# Patient Record
Sex: FEMALE | Race: WHITE | NOT HISPANIC OR LATINO | Employment: UNEMPLOYED | URBAN - METROPOLITAN AREA
[De-identification: names, ages, dates, MRNs, and addresses within clinical notes are randomized per-mention and may not be internally consistent; named-entity substitution may affect disease eponyms.]

---

## 2021-05-27 ENCOUNTER — RECORDS - HEALTHEAST (OUTPATIENT)
Dept: ADMINISTRATIVE | Facility: CLINIC | Age: 61
End: 2021-05-27

## 2021-05-28 ENCOUNTER — RECORDS - HEALTHEAST (OUTPATIENT)
Dept: ADMINISTRATIVE | Facility: CLINIC | Age: 61
End: 2021-05-28

## 2021-06-02 ENCOUNTER — RECORDS - HEALTHEAST (OUTPATIENT)
Dept: ADMINISTRATIVE | Facility: CLINIC | Age: 61
End: 2021-06-02

## 2021-06-16 PROBLEM — J43.2 CENTRILOBULAR EMPHYSEMA (H): Status: ACTIVE | Noted: 2017-06-29

## 2021-06-16 PROBLEM — K21.9 GERD (GASTROESOPHAGEAL REFLUX DISEASE): Status: ACTIVE | Noted: 2017-03-07

## 2021-06-16 PROBLEM — J45.20 MILD INTERMITTENT ASTHMA WITHOUT COMPLICATION: Status: ACTIVE | Noted: 2017-03-22

## 2021-06-26 ENCOUNTER — HOSPITAL ENCOUNTER (EMERGENCY)
Dept: EMERGENCY MEDICINE | Facility: HOSPITAL | Age: 61
Discharge: HOME | End: 2021-06-27
Attending: STUDENT IN AN ORGANIZED HEALTH CARE EDUCATION/TRAINING PROGRAM
Payer: COMMERCIAL

## 2021-06-26 DIAGNOSIS — L03.115 CELLULITIS OF RIGHT LOWER EXTREMITY: ICD-10-CM

## 2021-06-26 DIAGNOSIS — R60.0 BILATERAL LOWER EXTREMITY EDEMA: ICD-10-CM

## 2021-06-26 LAB
ANION GAP SERPL CALCULATED.3IONS-SCNC: 9 MMOL/L (ref 5–18)
BNP SERPL-MCNC: 12 PG/ML (ref 0–93)
BUN SERPL-MCNC: 16 MG/DL (ref 8–22)
CALCIUM SERPL-MCNC: 9.7 MG/DL (ref 8.5–10.5)
CHLORIDE BLD-SCNC: 108 MMOL/L (ref 98–107)
CO2 SERPL-SCNC: 25 MMOL/L (ref 22–31)
CREAT SERPL-MCNC: 0.9 MG/DL (ref 0.6–1.1)
ERYTHROCYTE [DISTWIDTH] IN BLOOD BY AUTOMATED COUNT: 15.9 % (ref 11–14.5)
GFR SERPL CREATININE-BSD FRML MDRD: >60 ML/MIN/1.73M2
GLUCOSE BLD-MCNC: 97 MG/DL (ref 70–125)
HCT VFR BLD AUTO: 39 % (ref 35–47)
HGB BLD-MCNC: 13.1 G/DL (ref 12–16)
MCH RBC QN AUTO: 31.1 PG (ref 27–34)
MCHC RBC AUTO-ENTMCNC: 33.6 G/DL (ref 32–36)
MCV RBC AUTO: 93 FL (ref 80–100)
PLATELET # BLD AUTO: 267 THOU/UL (ref 140–440)
PMV BLD AUTO: 9.2 FL (ref 8.5–12.5)
POTASSIUM BLD-SCNC: 4.4 MMOL/L (ref 3.5–5)
RBC # BLD AUTO: 4.21 MILL/UL (ref 3.8–5.4)
SODIUM SERPL-SCNC: 142 MMOL/L (ref 136–145)
TROPONIN I SERPL-MCNC: <0.01 NG/ML (ref 0–0.29)
WBC: 7.5 THOU/UL (ref 4–11)

## 2021-06-26 ASSESSMENT — MIFFLIN-ST. JEOR: SCORE: 1566.52

## 2021-06-27 LAB
ATRIAL RATE - MUSE: 87 BPM
DIASTOLIC BLOOD PRESSURE - MUSE: 86 MMHG
INTERPRETATION ECG - MUSE: NORMAL
P AXIS - MUSE: 41 DEGREES
PR INTERVAL - MUSE: 144 MS
QRS DURATION - MUSE: 94 MS
QT - MUSE: 400 MS
QTC - MUSE: 481 MS
R AXIS - MUSE: 47 DEGREES
SYSTOLIC BLOOD PRESSURE - MUSE: 141 MMHG
T AXIS - MUSE: 65 DEGREES
VENTRICULAR RATE- MUSE: 87 BPM

## 2021-06-27 RX ORDER — FUROSEMIDE 40 MG
40 TABLET ORAL DAILY
Qty: 5 TABLET | Refills: 0 | Status: SHIPPED | OUTPATIENT
Start: 2021-06-27 | End: 2021-07-02

## 2021-07-06 VITALS — HEIGHT: 62 IN | WEIGHT: 230 LBS | BODY MASS INDEX: 42.33 KG/M2

## 2021-07-07 NOTE — ED TRIAGE NOTES
Pt comes in by self. Had cellulites of the right leg about a month ago. States same symptoms are coming back notes warmth, redness and swelling in her right leg. Did not finish full course abx with last infection.

## 2021-07-07 NOTE — ED PROVIDER NOTES
EMERGENCY DEPARTMENT ENCOUNTER       ED Course & Medical Decision Making     10:14 PM I met with the patient, obtained history, performed an initial exam, and discussed options and plan for diagnostics and treatment here in the ED. PPE: Provider wore gloves, N95 mask, eye protection   12:45 AM Updated the patient. We discussed plans for discharge including supportive cares, symptomatic treatment, outpatient follow up, and reasons to return to the emergency department.      Final Impression  61 y.o. female presents for evaluation of some swelling and warmth in her right leg.  Patient reports that she was treated for cellulitis of the right leg on 5/1, was prescribed Keflex 500 mg 3x daily x10 days and symptoms improved.  On exam today patient does have some fairly symmetric +2 pitting edema of bilateral lower extremities, as well as some slight warmth though no appreciable erythema of the right leg.  No other skin changes suggestive of advanced cellulitis or other skin infection.  Denies CHF history, but does have significant smoking history.  Denies having a current primary care provider.  Work-up in the ED shows negative DVT studies of bilateral lower extremities, nonischemic EKG, negative troponin, clear chest x-ray, normal CBC, BMP, negative BNP.  No signs of acute heart failure.  Discussed with patient that I suspect her leg swelling is predisposing her to cellulitis, was given a one-time dose of Lasix in the ED and had good urine response.  Patient reports about 10-15 pounds of weight gain in the last 1-2 months unintentionally.  Based on exam, appears to be retaining fluid in her legs and likely other tissues.  Will start patient on a 5-day course of Lasix 40 mg daily to help clear water weight from her legs, will also start on Keflex (500 mg twice daily x5 days dosing chosen because patient stated she would not be able to take the medication 4 times a day, did not want to take a 7 or 10-day course).  Will  recommend she follow-up with a primary care clinic, primary care referral made this patient should get plugged in with a primary care provider for further work-up for long-term treatment of her recurrent leg swelling.    Prior to making a final disposition on this patient the results of patient's tests and other diagnostic studies were discussed with the patient. All questions were answered. Patient expressed understanding of the plan and was amenable to it.    Medications   furosemide injection 40 mg (LASIX) (40 mg Intravenous Given 6/26/21 2304)       Final Impression     1. Bilateral lower extremity edema    2. Cellulitis of right lower extremity        Chief Complaint     Chief Complaint   Patient presents with     Possible Cellulitus       Pt comes in by self. Had cellulites of the right leg about a month ago. States same symptoms are coming back notes warmth, redness and swelling in her right leg. Did not finish full course abx with last infection.        HPI     Nadine Rene is a 61 y.o. female who presents for evaluation of leg swelling.     Per chart review, patient was seen in this ED on 5/1/2021 for evaluation of lower right leg pain with associated swelling, erythema, and pruritus. Labs showed no leukocytosis (WBC 8.4) and mildly elevated CRP (2.7). X-ray right tib/fib negative for gas or radiopaque foreign object. Venous ultrasound was negative. CXR demonstrated low lung volumes and underlying subtle interstitial process bilaterally. Patient was discharged home with prescription for Keflex.        Patient reports that she has had new leg swelling (right > left) for the past 3 days. Her leg swelling is improved in the morning when she gets up and progressively worsens throughout the day. She also reports that she has gained 10-20 lbs over the last 1-2 months. She notes that she had cellulitis of her right leg a couple months ago at which time she presented with redness, warmth, and swelling to  her right leg. No recent travel or surgery. She does not take any diuretics. No history of heart problems or blood clots. She does report a history of COPD.    Additionally, patient reports that she has had right shoulder pain for about 1 year that she thinks was caused by a fall. Her pain was improving, but she took another fall more recently when she tripped over her dog and her pain returned. She endorses associated decreased range of motion at the right shoulder, noting that her shoulder feels better when she keeps her right arm close to her body.     Social history: Patient is a current smoker (reports 2-3 cigarettes per day).       I, Kailyn Sandoval, am serving as a scribe to document services personally performed by Dr. Shawn Ritter MD, based on my observation and the provider's statements to me. I, Dr. Shawn Ritter MD attest that Kailyn Sandoval is acting in a scribe capacity, has observed my performance of the services and has documented them in accordance with my direction.    Past Medical History     No past medical history on file.  Relevant past surgical history reviewed with patient, unless otherwise stated in HPI, history not pertinent to this visit.  No family history on file.   Social History     Tobacco Use     Smoking status: Current Every Day Smoker     Smokeless tobacco: Never Used   Substance Use Topics     Alcohol use: Not on file     Drug use: Not on file       Relevant past medical, surgical, family and social history as documented above, has been reviewed and discussed with patient. No changes or additions, unless otherwise noted in the HPI.    Current Medications     Current Discharge Medication List      START taking these medications    Details   cephalexin (KEFLEX) 500 MG capsule Take 1 capsule (500 mg total) by mouth 2 (two) times a day for 5 days.  Qty: 10 capsule, Refills: 0    Associated Diagnoses: Cellulitis of right lower extremity      furosemide (LASIX) 40 MG tablet Take  1 tablet (40 mg total) by mouth daily for 5 days.  Qty: 5 tablet, Refills: 0    Associated Diagnoses: Bilateral lower extremity edema         CONTINUE these medications which have NOT CHANGED    Details   albuterol (PROAIR HFA;PROVENTIL HFA;VENTOLIN HFA) 90 mcg/actuation inhaler Inhale 2 puffs every 4 (four) hours as needed for wheezing or shortness of breath.  Qty: 1 Inhaler, Refills: 0    Comments: May substitute the equivalent medication per insurance preference.  Associated Diagnoses: History of COPD      albuterol (PROVENTIL) 2.5 mg /3 mL (0.083 %) nebulizer solution Take 3 mL (2.5 mg total) by nebulization every 4 (four) hours as needed for wheezing or shortness of breath.  Qty: 75 mL, Refills: 0    Associated Diagnoses: History of COPD      ibuprofen (ADVIL,MOTRIN) 400 MG tablet Take 1 tablet (400 mg total) by mouth every 6 (six) hours as needed for pain.  Qty: 30 tablet, Refills: 0    Associated Diagnoses: Acute pain of right shoulder      inhalational spacing device Spcr Use as directed with albuterol inhaler  Qty: 1 each, Refills: 0    Associated Diagnoses: History of COPD             Allergies     No Known Allergies    Review of Systems     Constitutional: Endorses unintentional weight gain (10-15 lbs in past few months). Denies fever, chills, or fatigue   Eyes: Denies visual changes or discharge  HENT: Denies sore throat, ear pain or neck pain  Respiratory: Denies cough or shortness of breath    Cardiovascular: Endorses leg swelling (right > left). Denies chest pain or palpitations   GI: Denies abdominal pain, nausea, vomiting, or dark, bloody stools  : Denies hematuria, dysuria, or flank pain  Musculoskeletal: Endorses right shoulder pain and decreased ROM (chronic, >1 year). Denies any new back pain  Skin: Denies rashes or wound  Neurologic: Denies current headache, new weakness, focal weakness, or sensory changes      Remainder of systems reviewed, unless noted in HPI all others  "negative.    Physical Exam     /86   Pulse 85   Temp 98.7  F (37.1  C) (Oral)   Resp 16   Ht 5' 2\" (1.575 m)   Wt (!) 230 lb (104.3 kg)   SpO2 94%   BMI 42.07 kg/m    Constitutional: Awake, alert, in no acute distress  Head: Normocephalic, atraumatic.  ENT: Mucous membranes moist.   Eyes: PERRL, EOMI, Conjunctiva normal  Respiratory: Respirations even, unlabored. Lungs clear to ascultation bilaterally, no crackles, in no acute respiratory distress.  Cardiovascular: Regular rate and rhythm. +2 radial pulses, equal bilaterally. +2 pitting edema bilateral lower extremities  GI: Abdomen soft, non-tender to palpation in all 4 quadrants. No guarding or rebound. Bowel sounds intact on all 4 quadrants.   : No CVA tenderness.    Musculoskeletal: Moves all 4 extremities equally, strength symmetrical on bilateral uppers and lowers.  Integument: Warm, dry.  Very mild warmth of the right leg when compared to the left leg.  No appreciable erythema of lower extremities, skin appears normal in bilateral lower legs, no rashes, wounds, or drainage.  Extremely dry, thickened, cracked skin on bilateral feet, though no signs of focal infection feet.  Neurologic: Alert & oriented x 3. Normal speech. Grossly normal motor and sensory function. No focal deficits noted.  Psychiatric: Normal mood and affect.     Labs     Results for orders placed or performed during the hospital encounter of 06/26/21   HM2 (CBC W/O DIFF)   Result Value Ref Range    WBC 7.5 4.0 - 11.0 thou/uL    RBC 4.21 3.80 - 5.40 mill/uL    Hemoglobin 13.1 12.0 - 16.0 g/dL    Hematocrit 39.0 35.0 - 47.0 %    MCV 93 80 - 100 fL    MCH 31.1 27.0 - 34.0 pg    MCHC 33.6 32.0 - 36.0 g/dL    RDW 15.9 (H) 11.0 - 14.5 %    Platelets 267 140 - 440 thou/uL    MPV 9.2 8.5 - 12.5 fL   Basic Metabolic Panel   Result Value Ref Range    Sodium 142 136 - 145 mmol/L    Potassium 4.4 3.5 - 5.0 mmol/L    Chloride 108 (H) 98 - 107 mmol/L    CO2 25 22 - 31 mmol/L    Anion Gap, " Calculation 9 5 - 18 mmol/L    Glucose 97 70 - 125 mg/dL    Calcium 9.7 8.5 - 10.5 mg/dL    BUN 16 8 - 22 mg/dL    Creatinine 0.90 0.60 - 1.10 mg/dL    GFR MDRD Af Amer >60 >60 mL/min/1.73m2    GFR MDRD Non Af Amer >60 >60 mL/min/1.73m2   BNP(B-type Natriuretic Peptide)   Result Value Ref Range    BNP 12 0 - 93 pg/mL   Troponin I   Result Value Ref Range    Troponin I <0.01 0.00 - 0.29 ng/mL       EKG     Normal sinus rhythm, rate 87.  .  QRS 94.  QTc 41.  No ST segment elevations or depressions.      Radiology     I have ordered and reviewed the following imaging exams. Upon independent review of the images and radiology reads, I agree with the reads documented below.    Xr Chest 2 Views    Result Date: 6/27/2021  EXAM: XR CHEST 2 VIEWS LOCATION: [facility] DATE/TIME: 6/26/2021 11:47 PM INDICATION: Pinning edema. Evaluate for CHF. COMPARISON: None. FINDINGS: The heart size is normal. The thoracic aorta is calcified. The lungs are clear. No pneumothorax or pleural effusion.     No acute abnormality.    Us Venous Legs Bilateral    Result Date: 6/27/2021  EXAM: US VENOUS LEGS BILATERAL LOCATION: [facility] DATE/TIME: 6/26/2021 11:37 PM INDICATION: Bilateral leg swelling, R > L COMPARISON: None. TECHNIQUE: Venous Duplex ultrasound of bilateral lower extremities with and without compression, augmentation and duplex. Color flow and spectral Doppler with waveform analysis performed. FINDINGS: Exam includes the common femoral, femoral, popliteal veins as well as segmentally visualized deep calf veins and greater saphenous vein. RIGHT: No deep vein thrombosis. No superficial thrombophlebitis. No popliteal cyst. LEFT: No deep vein thrombosis. No superficial thrombophlebitis. No popliteal cyst.     1.  No deep venous thrombosis in the bilateral lower extremities.       Shawn Ritter MD  06/27/21 0131

## 2022-04-27 ENCOUNTER — HOSPITAL ENCOUNTER (EMERGENCY)
Facility: CLINIC | Age: 62
Discharge: HOME | End: 2022-04-27
Attending: EMERGENCY MEDICINE | Admitting: EMERGENCY MEDICINE
Payer: COMMERCIAL

## 2022-04-27 ENCOUNTER — APPOINTMENT (OUTPATIENT)
Dept: RADIOLOGY | Facility: CLINIC | Age: 62
End: 2022-04-27
Attending: EMERGENCY MEDICINE
Payer: COMMERCIAL

## 2022-04-27 VITALS
WEIGHT: 245 LBS | BODY MASS INDEX: 43.41 KG/M2 | SYSTOLIC BLOOD PRESSURE: 119 MMHG | RESPIRATION RATE: 20 BRPM | HEART RATE: 83 BPM | DIASTOLIC BLOOD PRESSURE: 88 MMHG | HEIGHT: 63 IN | TEMPERATURE: 99 F | OXYGEN SATURATION: 96 %

## 2022-04-27 DIAGNOSIS — S83.92XA SPRAIN OF LEFT KNEE, UNSPECIFIED LIGAMENT, INITIAL ENCOUNTER: ICD-10-CM

## 2022-04-27 PROCEDURE — 99284 EMERGENCY DEPT VISIT MOD MDM: CPT | Mod: 25

## 2022-04-27 PROCEDURE — 73562 X-RAY EXAM OF KNEE 3: CPT | Mod: LT

## 2022-04-27 PROCEDURE — 29505 APPLICATION LONG LEG SPLINT: CPT | Mod: LT

## 2022-04-28 NOTE — ED PROVIDER NOTES
EMERGENCY DEPARTMENT ENCOUNTER      NAME: Nadine Rene  AGE: 62 year old female  YOB: 1960  MRN: [id number]  EVALUATION DATE & TIME: 4/27/2022 10:02 PM    PCP: No primary care provider on file.    ED PROVIDER: Herberth Jimenes M.D.      Chief Complaint   Patient presents with     Knee Pain         FINAL IMPRESSION:  1. Sprain of left knee, unspecified ligament, initial encounter          ED COURSE & MEDICAL DECISION MAKING:    Pertinent Labs & Imaging studies reviewed. (See chart for details)  62 year old female presents to the Emergency Department for evaluation of left knee pain.  Felt to give out.  Seems likely ligamentous injury.  No signs of muscle rupture.  No signs of infection.  We will give her a knee immobilizer.  We will have her follow-up with [facility].  Return for worsening symptoms.  RICE therapy discussed    10:05 PM I met with the patient to gather history and to perform my initial exam. I discussed the plan for care while in the Emergency Department. PPE: facemask  10:25 PM Patient ready for discharge, no further question at this time.    At the conclusion of the encounter I discussed the results of all of the tests and the disposition. The questions were answered. The patient or family acknowledged understanding and was agreeable with the care plan.           MEDICATIONS GIVEN IN THE EMERGENCY:  Medications - No data to display    NEW PRESCRIPTIONS STARTED AT TODAY'S ER VISIT  Discharge Medication List as of 4/27/2022 10:28 PM             =================================================================    HPI    Patient information was obtained from: Patient and triage note    Use of : N/A        Nadine Rene is a 62 year old female with a pertinent history of centrilobular emphysema, GERD, and asthma who presents to this ED via private car for evaluation of knee pain.    Per triage note, the patient took ibuprofen at 1600 today.    Last night  "(4/26), the patient began to endorse pain to her left knee, not too bad, but thinking she had possible twisted it. Today (4/27), she was getting out of her truck, when her knee \"gave out.\" She denies this ever happening before, and states that the pain is localized to her posterior left knee, exacerbated with movement. She denies history of prior knee surgery, or diabetes. Patient presents in wheelchair. No other complaints at this time.    REVIEW OF SYSTEMS   Review of Systems   Musculoskeletal:        Positive for left posterior knee pain.   All other systems reviewed and are negative.       PAST MEDICAL HISTORY:  History reviewed. No pertinent past medical history.    PAST SURGICAL HISTORY:  No past surgical history on file.        CURRENT MEDICATIONS:    No current facility-administered medications for this encounter.     Current Outpatient Medications   Medication     albuterol (PROAIR HFA;PROVENTIL HFA;VENTOLIN HFA) 90 mcg/actuation inhaler     albuterol (PROVENTIL) 2.5 mg /3 mL (0.083 %) nebulizer solution     furosemide (LASIX) 40 MG tablet     ibuprofen (ADVIL,MOTRIN) 400 MG tablet     inhalational spacing device Spcr         ALLERGIES:  No Known Allergies    FAMILY HISTORY:  No family history on file.    SOCIAL HISTORY:   Social History     Socioeconomic History     Marital status:    Tobacco Use     Smoking status: Current Every Day Smoker     Smokeless tobacco: Never Used       VITALS:  /88   Pulse 83   Temp 99  F (37.2  C) (Temporal)   Resp 20   Ht 1.6 m (5' 3\")   Wt 111.1 kg (245 lb)   SpO2 96%   BMI 43.40 kg/m      PHYSICAL EXAM    Physical Exam  Constitutional:       General: She is not in acute distress.     Appearance: She is well-developed. She is not diaphoretic.   HENT:      Head: Normocephalic and atraumatic.   Eyes:      General: No scleral icterus.  Cardiovascular:      Pulses: Normal pulses.      Heart sounds: Normal heart sounds.   Pulmonary:      Effort: Pulmonary " effort is normal.      Breath sounds: Normal breath sounds.   Musculoskeletal:      Cervical back: Normal range of motion and neck supple.      Comments: There is swelling of the left knee.  Tender over the medial aspect.  Pain with valgus stress.  Minimal pain with varus stress.  Significant pain with anterior drawer test.  Not able to perform properly.  Distal pulses intact.  Strength intact   Skin:     General: Skin is warm and dry.      Coloration: Skin is not pale.      Findings: No erythema or rash.   Neurological:      Mental Status: She is alert and oriented to person, place, and time.           LAB:  All pertinent labs reviewed and interpreted.  Labs Ordered and Resulted from Time of ED Arrival to Time of ED Departure - No data to display    RADIOLOGY:  Reviewed all pertinent imaging. Please see official radiology report.  XR Knee Left 3 Views   Final Result   IMPRESSION: Normal joint spaces and alignment. No fracture or joint effusion.            I, Paige Martines, am serving as a scribe to document services personally performed by Dr. Herberth Jimenes, based on my observation and the provider's statements to me. I, Herberth Jimenes MD attest that Paige Martines is acting in a scribe capacity, has observed my performance of the services and has documented them in accordance with my direction.    Herberth Jimenes M.D.  Emergency Medicine  [facility] EMERGENCY ROOM  [address] [id number] [phone]  Dept: [phone]       Herberth Jimenes MD  04/27/22 4004

## 2022-04-28 NOTE — ED TRIAGE NOTES
"Arrives to ED with c/o L knee pain that began last night. \"Gave out\" when pt was getting out of truck. Denies fall. Denies injury. Took tylenol and ibuprofen last at 1600. Reports unable to bear weight d/t pain.      Triage Assessment     Row Name 04/27/22 2047       Triage Assessment (Adult)    Airway WDL WDL       Respiratory WDL    Respiratory WDL WDL       Skin Circulation/Temperature WDL    Skin Circulation/Temperature WDL WDL       Cardiac WDL    Cardiac WDL WDL       Peripheral/Neurovascular WDL    Peripheral Neurovascular WDL WDL       Cognitive/Neuro/Behavioral WDL    Cognitive/Neuro/Behavioral WDL WDL              "

## 2023-08-02 ENCOUNTER — APPOINTMENT (OUTPATIENT)
Dept: RADIOLOGY | Facility: HOSPITAL | Age: 63
End: 2023-08-02
Payer: COMMERCIAL

## 2023-08-02 ENCOUNTER — HOSPITAL ENCOUNTER (EMERGENCY)
Facility: HOSPITAL | Age: 63
Discharge: HOME | End: 2023-08-02
Payer: COMMERCIAL

## 2023-08-02 VITALS
SYSTOLIC BLOOD PRESSURE: 119 MMHG | TEMPERATURE: 98.5 F | RESPIRATION RATE: 22 BRPM | HEIGHT: 62 IN | DIASTOLIC BLOOD PRESSURE: 66 MMHG | OXYGEN SATURATION: 97 % | WEIGHT: 257 LBS | HEART RATE: 74 BPM | BODY MASS INDEX: 47.29 KG/M2

## 2023-08-02 DIAGNOSIS — S81.819A LACERATION OF SHIN: ICD-10-CM

## 2023-08-02 PROCEDURE — 99283 EMERGENCY DEPT VISIT LOW MDM: CPT | Mod: 25

## 2023-08-02 PROCEDURE — 250N000009 HC RX 250

## 2023-08-02 PROCEDURE — 73610 X-RAY EXAM OF ANKLE: CPT | Mod: RT

## 2023-08-02 PROCEDURE — 250N000011 HC RX IP 250 OP 636

## 2023-08-02 PROCEDURE — 90471 IMMUNIZATION ADMIN: CPT

## 2023-08-02 PROCEDURE — 12001 RPR S/N/AX/GEN/TRNK 2.5CM/<: CPT

## 2023-08-02 PROCEDURE — 90714 TD VACC NO PRESV 7 YRS+ IM: CPT

## 2023-08-02 RX ORDER — GINSENG 100 MG
CAPSULE ORAL ONCE
Status: COMPLETED | OUTPATIENT
Start: 2023-08-02 | End: 2023-08-02

## 2023-08-02 RX ORDER — TRANEXAMIC ACID 100 MG/ML
INJECTION, SOLUTION INTRAVENOUS ONCE
Status: DISCONTINUED | OUTPATIENT
Start: 2023-08-02 | End: 2023-08-02

## 2023-08-02 RX ORDER — CEPHALEXIN 500 MG/1
500 CAPSULE ORAL 4 TIMES DAILY
Qty: 28 CAPSULE | Refills: 0 | Status: SHIPPED | OUTPATIENT
Start: 2023-08-02 | End: 2023-08-09

## 2023-08-02 RX ADMIN — BACITRACIN: 500 OINTMENT TOPICAL at 19:30

## 2023-08-02 RX ADMIN — Medication 3 ML: at 18:08

## 2023-08-02 RX ADMIN — CLOSTRIDIUM TETANI TOXOID ANTIGEN (FORMALDEHYDE INACTIVATED) AND CORYNEBACTERIUM DIPHTHERIAE TOXOID ANTIGEN (FORMALDEHYDE INACTIVATED) 0.5 ML: 5; 2 INJECTION, SUSPENSION INTRAMUSCULAR at 19:35

## 2023-08-02 ASSESSMENT — ENCOUNTER SYMPTOMS
WOUND: 1
NUMBNESS: 1

## 2023-08-02 ASSESSMENT — ACTIVITIES OF DAILY LIVING (ADL): ADLS_ACUITY_SCORE: 35

## 2023-08-02 NOTE — ED TRIAGE NOTES
Pt was moving a mirror from garage to inside the house when it broke and cut right anterior ankle.  Bleeding not controlled.  Pt edematous bilateral lower extremities.  Cut appears deep.     Triage Assessment       Row Name 08/02/23 0053       Triage Assessment (Adult)    Airway WDL WDL       Respiratory WDL    Respiratory WDL WDL       Peripheral/Neurovascular WDL    Peripheral Neurovascular WDL WDL

## 2023-08-02 NOTE — ED PROVIDER NOTES
EMERGENCY DEPARTMENT ENCOUNTER      NAME: Nadine Rene  AGE: 63 year old female  YOB: 1960  MRN: [id number]  EVALUATION DATE & TIME: No admission date for patient encounter.    PCP: [facility]    ED PROVIDER: Rina Villaseñor PA-C    Chief Complaint   Patient presents with    Laceration     FINAL IMPRESSION:  1. Laceration of shin      MEDICAL DECISION MAKING:    Pertinent Labs & Imaging studies reviewed. (See chart for details)  Nadine Rene is a 63 year old female who presents for evaluation of laceration to right ankle/shin.  1 hour prior to evaluation, patient reports she was moving a mirror from the garage to her house when it accidentally slipped from her hands causing it to fall onto her right ankle.  Patient reports the mirror did break, but she does not think there is glass in the wound.  Has baseline swelling to bilateral lower extremities as well as numbness and tingling to feet.  Tetanus is not up-to-date.    On my initial evaluation, vitals normal.  On physical exam, patient is awake, alert, no acute distress, resting in bed.  She is mildly uncomfortable and anxious appearing, but is not ill or toxic.  She is obese.  On inspection of her right lower extremity, she has a bandage in place, this was removed and revealed a 2 cm superficial abrasion/laceration to right anterior distal shin extending into medial ankle.  No active bleeding. No deep laceration or puncture wound. No foreign body. Full ROM of ankle. No bony tenderness to shin, ankle or foot. She has 1+ pitting edema to bilateral lower extremities.    Did obtain an x-ray today to evaluate for possible foreign object, x-ray negative for fracture, dislocation or any foreign object.  Due to significant initial bleeding in triage, pressure and LET were applied. Plan was for possible tourniquet and TXA, however on recheck, bleeding has stopped significantly and did not require further intervention  thankfully. Wound was copiously irrigated and inspected for any foreign body, tendon or ligamentous injury as well as any arterial injury for which there were none.  Wound is actually very superficial and more of a deep abrasion rather than a laceration, however due to return of mild bleeding with movements I did feel sutures would be beneficial in this situation..  Repaired using 5 sutures.  Tetanus was updated today.  Bacitracin and sterile dressing were applied.  Patient was neurovascularly intact prior to and after repair.  Provided proper wound care management as well as strict return precautions to the ER.  Due to patient history of recurrent lower extremity cellulitis, I will prescribe Keflex to help prevent infection.  Patient reports she does not have a PCP to follow-up with so I placed a referral for her to establish care as well for wound recheck and suture removal in 10 days.  All questions addressed prior to discharge.    Patient has had serial examinations and notes significant improvement.     Patient was discharged in stable condition with treatment plan as below. Instructed to follow up with primary care provider in 10 days for suture removal and return to the emergency department with any new or worsening of symptoms. Patient expressed understanding, feels comfortable, and is in agreement with this plan. All questions addressed prior to discharge.    Medical Decision Making    History:  Supplemental history from: N/A  External Record(s) reviewed: Documented in chart, if applicable.    Work Up:  Chart documentation includes differential considered and any EKGs or imaging independently interpreted by provider, where specified.  In additional to work up documented, I considered the following work up: Documented in chart, if applicable.    External consultation:  Discussion of management with another provider: Documented in chart, if applicable    Complicating factors:  Care impacted by chronic  illness: Chronic Pain  Care affected by social determinants of health: N/A    Disposition considerations: Discharge. I prescribed additional prescription strength medication(s) as charted. N/A.    ED COURSE:  5:24 PM  I reviewed the patient's chart. I met with the patient to gather history and to perform my initial exam.   6:44 PM I discussed this patient case with Dr. Fatima who evaluated with me at bedside  6:49 PM Repaired the laceration. We discussed plan for discharge including treatment plan, follow-up and return precautions to emergency department.  Patient voiced understanding and in agreement with this plan.    At the conclusion of the encounter I discussed the results of all of the tests and the disposition. The questions were answered. The patient or family acknowledged understanding and was agreeable with the care plan.     MEDICATIONS GIVEN IN THE EMERGENCY:  Medications   Td (tetanus & diphtheria toxoids) -  adult formulation - for ages 7 years and older (has no administration in time range)   lido-EPINEPHrine-tetracaine (LET) topical gel GEL (3 mLs Topical $Given 8/2/23 1808)   bacitracin ointment ( Topical $Given 8/2/23 1930)     NEW PRESCRIPTIONS STARTED AT TODAY'S ER VISIT  New Prescriptions    CEPHALEXIN (KEFLEX) 500 MG CAPSULE    Take 1 capsule (500 mg) by mouth 4 times daily for 7 days     =================================================================    HPI:    Patient information was obtained from: Patient    Use of Interpretor: N/A      Nadine Rene is a 63 year old female with a pertinent history of chronic pain who presents to this ED via walk in for evaluation of laceration.    Patient reports an hour ago she was in her garage moving a mirror when it fell. She suffered a cut to her right ankle. She cleaned the cut and wrapped the ankle. Notes it did not bleed immediately. Does not think anything got stuck in the wound, but does report the mirror broke and is unsure if there  is glass in the wound.. She took tylenol and current pain is tolerable. Came to the emergency room to be evaluated. Notes baseline bilateral lower extremity numbness and tingling. History of leg swelling and is taking medications for it. Tetanus is not up to date.     REVIEW OF SYSTEMS:  Review of Systems   Skin:  Positive for wound (right ankle).   Neurological:  Positive for numbness (baseline).      PAST MEDICAL HISTORY:  History reviewed. No pertinent past medical history.    PAST SURGICAL HISTORY:  History reviewed. No pertinent surgical history.    CURRENT MEDICATIONS:      Current Facility-Administered Medications:     Td (tetanus & diphtheria toxoids) -  adult formulation - for ages 7 years and older, 0.5 mL, Intramuscular, Once, Rina Villaseñor PA-C    Current Outpatient Medications:     cephALEXin (KEFLEX) 500 MG capsule, Take 1 capsule (500 mg) by mouth 4 times daily for 7 days, Disp: 28 capsule, Rfl: 0    albuterol (PROAIR HFA;PROVENTIL HFA;VENTOLIN HFA) 90 mcg/actuation inhaler, [ALBUTEROL (PROAIR HFA;PROVENTIL HFA;VENTOLIN HFA) 90 MCG/ACTUATION INHALER] Inhale 2 puffs every 4 (four) hours as needed for wheezing or shortness of breath., Disp: 1 Inhaler, Rfl: 0    albuterol (PROVENTIL) 2.5 mg /3 mL (0.083 %) nebulizer solution, [ALBUTEROL (PROVENTIL) 2.5 MG /3 ML (0.083 %) NEBULIZER SOLUTION] Take 3 mL (2.5 mg total) by nebulization every 4 (four) hours as needed for wheezing or shortness of breath., Disp: 75 mL, Rfl: 0    furosemide (LASIX) 40 MG tablet, [FUROSEMIDE (LASIX) 40 MG TABLET] Take 1 tablet (40 mg total) by mouth daily for 5 days., Disp: 5 tablet, Rfl: 0    ibuprofen (ADVIL,MOTRIN) 400 MG tablet, [IBUPROFEN (ADVIL,MOTRIN) 400 MG TABLET] Take 1 tablet (400 mg total) by mouth every 6 (six) hours as needed for pain., Disp: 30 tablet, Rfl: 0    inhalational spacing device Spcr, [INHALATIONAL SPACING DEVICE SPCR] Use as directed with albuterol inhaler, Disp: 1 each, Rfl: 0    ALLERGIES:  No  "Known Allergies    FAMILY HISTORY:  History reviewed. No pertinent family history.    SOCIAL HISTORY:   Social History     Socioeconomic History    Marital status:    Tobacco Use    Smoking status: Every Day    Smokeless tobacco: Never     VITALS:  Patient Vitals for the past 24 hrs:   BP Temp Temp src Pulse Resp SpO2 Height Weight   08/02/23 1900 119/66 -- -- 74 -- 97 % -- --   08/02/23 1830 119/87 -- -- 80 -- 96 % -- --   08/02/23 1821 114/79 -- -- 80 -- 94 % -- --   08/02/23 1714 106/67 98.5  F (36.9  C) Oral 89 22 93 % 1.575 m (5' 2\") 116.6 kg (257 lb)     PHYSICAL EXAM    Constitutional: Well developed, Well nourished, NAD, uncomfortable and anxious appearing, obese, not toxic  HENT: Normocephalic, Atraumatic, Bilateral external ears normal, Oropharynx normal, mucous membranes moist, Nose normal.   Neck: Normal range of motion, No tenderness, Supple, No stridor.  Eyes: PERRL, EOMI, Conjunctiva normal, No discharge.   Musculoskeletal/integument: On inspection of her right lower extremity, she has a bandage in place, this was removed and revealed a 2 cm superficial abrasion/laceration to right anterior distal shin extending into medial ankle.  No active bleeding. No deep laceration or puncture wound. No foreign body. Full ROM of ankle. No bony tenderness to shin, ankle or foot. She has 1+ pitting edema to bilateral lower extremities.2+ DP pulses. No edema. No cyanosis, No clubbing. Good range of motion in all major joints. No tenderness to palpation or major deformities noted. No tenderness of the CTLS spine.   Neurologic: Alert & oriented x 3, Normal motor function, Normal sensory function, No focal deficits noted. Normal gait.  Psychiatric: Affect normal, Judgment normal, Mood normal. Cooperative.    LAB:  All pertinent labs reviewed and interpreted.  Labs Ordered and Resulted from Time of ED Arrival to Time of ED Departure - No data to display    RADIOLOGY:  Reviewed all pertinent imaging. Please see " official radiology report.  Ankle XR, G/E 3 views, right   Final Result   IMPRESSION: There is no radiopaque foreign body or soft tissue gas about the ankle. Soft tissue swelling. Ankle mortise is intact. No evidence of an acute fracture.          EKG:    None     PROCEDURES:   PROCEDURE: Laceration Repair   INDICATIONS: Laceration   PROCEDURE PROVIDER: Rina Villaseñor PA-C.    SITE: Right shin   TYPE/SIZE: simple, superficial, clean, and no foreign body visualized  2 cm (total length)   FUNCTIONAL ASSESSMENT: Distal sensation, circulation, and motor intact   MEDICATION: 6 mLs of 1% Lidocaine with epinephrine   PREPARATION: irrigation with Normal saline   DEBRIDEMENT: wound explored, no foreign body found   CLOSURE:  Superficial layer closed with 5 stitches of 3-0 Ethilon simple interrupted    Total number of sutures/staples placed: 5            Diagnosis:  1. Laceration of michaels        Toyin ARRIAGA, am serving as a scribe to document services personally performed by Rina Villaseñor PA-C based on my observation and the provider's statements to me. I, Rina Villaseñor PA-C attest that Toyin Jacome is acting in a scribe capacity, has observed my performance of the services and has documented them in accordance with my direction.    Rina Villaseñor PA-C  Emergency Medicine  [facility]  8/2/2023       Rina Villaseñor PA-C  08/02/23 1935

## 2023-08-03 NOTE — DISCHARGE INSTRUCTIONS
You are seen in the ER for laceration to your right shin.  You had an x-ray done today that did not show any broken bones or any remaining glass in the wound.  Your wound was cleaned and repaired using 5 stitches today.  As we discussed, please get the stitches removed in 10 days by your primary care provider.  A referral was placed today for you to establish care.  Please have your wound rechecked in the next 5 to 10 days.  Keep current dressing in place for the first 24 hours and do daily dressing changes after that.  Do not get wet for the first 3 days. Start using warm soapy water to clean the area on day 3 and keep it covered until stitches are removed.  Do not go in any hot tubs, pools, saunas or any bodies of water until the sutures are removed.  .  Please take the antibiotic Keflex 4 times a day for 7 days to help prevent infection.

## 2024-10-09 ENCOUNTER — HOSPITAL ENCOUNTER (EMERGENCY)
Facility: HOSPITAL | Age: 64
Discharge: HOME | End: 2024-10-09
Attending: EMERGENCY MEDICINE | Admitting: EMERGENCY MEDICINE
Payer: COMMERCIAL

## 2024-10-09 VITALS
BODY MASS INDEX: 38.98 KG/M2 | SYSTOLIC BLOOD PRESSURE: 115 MMHG | HEART RATE: 86 BPM | TEMPERATURE: 97.8 F | HEIGHT: 63 IN | RESPIRATION RATE: 24 BRPM | DIASTOLIC BLOOD PRESSURE: 58 MMHG | WEIGHT: 220 LBS | OXYGEN SATURATION: 98 %

## 2024-10-09 DIAGNOSIS — F41.0 PANIC ATTACK: ICD-10-CM

## 2024-10-09 LAB
HOLD SPECIMEN: NORMAL

## 2024-10-09 PROCEDURE — 99284 EMERGENCY DEPT VISIT MOD MDM: CPT

## 2024-10-09 PROCEDURE — 93005 ELECTROCARDIOGRAM TRACING: CPT | Performed by: EMERGENCY MEDICINE

## 2024-10-09 PROCEDURE — 93005 ELECTROCARDIOGRAM TRACING: CPT | Performed by: STUDENT IN AN ORGANIZED HEALTH CARE EDUCATION/TRAINING PROGRAM

## 2024-10-09 ASSESSMENT — COLUMBIA-SUICIDE SEVERITY RATING SCALE - C-SSRS
2. HAVE YOU ACTUALLY HAD ANY THOUGHTS OF KILLING YOURSELF IN THE PAST MONTH?: NO
6. HAVE YOU EVER DONE ANYTHING, STARTED TO DO ANYTHING, OR PREPARED TO DO ANYTHING TO END YOUR LIFE?: NO
1. IN THE PAST MONTH, HAVE YOU WISHED YOU WERE DEAD OR WISHED YOU COULD GO TO SLEEP AND NOT WAKE UP?: NO

## 2024-10-09 ASSESSMENT — ACTIVITIES OF DAILY LIVING (ADL): ADLS_ACUITY_SCORE: 35

## 2024-10-09 NOTE — ED TRIAGE NOTES
Pt homeless was at neighbors from where staying with brother for abdominal pain and anxiety/panic attack.  Pt continues to fall asleep during triage.     Triage Assessment (Adult)       Row Name 10/09/24 1003          Triage Assessment    Airway WDL WDL        Respiratory WDL    Respiratory WDL WDL

## 2024-10-09 NOTE — ED PROVIDER NOTES
EMERGENCY DEPARTMENT ENCOUNTER      NAME: Nadine Rene  AGE: 64 year old female  YOB: 1960  MRN: [id number]  EVALUATION DATE & TIME: 10/9/2024  9:48 AM    PCP: [facility]    ED PROVIDER: Keara Strickland M.D.      Chief Complaint   Patient presents with    Anxiety    Abdominal Pain         FINAL IMPRESSION:  1. Panic attack          ED COURSE & MEDICAL DECISION MAKING:     Patient with long history of anxiety disorder and panic attacks per her, here with normal VS and examination, negative ROS currently, and resolved anxiety/panic attack. She is ok with plan to trial her home prescribed anxiolytic therapy. Review of Rx medications on chart and care everywhere do not reveal its name but she notes she has it at home to use and can closely follow up with her PMD to ensure it is helping if she has more panic. Patient discharged after being provided with extensive anticipatory guidance and given return precautions, importance of PMD follow-up emphasized.     Pertinent Labs & Imaging studies reviewed. (See chart for details)    Medical Decision Making  Obtained supplemental history:Supplemental history obtained?: Documented in chart and Family Member/Significant Other  Reviewed external records: External records reviewed?: Documented in chart  Care impacted by chronic illness:Chronic Pain, Mental Health, and Smoking / Nicotine Use  Did you consider but not order tests?: Work up considered but not performed and documented in chart, if applicable  Did you interpret images independently?: Independent interpretation of ECG and images noted in documentation, when applicable.  Consultation discussion with other provider:Did you involve another provider (consultant, MH, pharmacy, etc.)?: No  Discharge. I recommended the patient continue their current prescription strength medication(s): anxiolytic from PMD. See documentation for any additional details.    MIPS: Not Applicable      At the  "conclusion of the encounter I discussed the results of all of the tests and the disposition. The questions were answered. The patient or family acknowledged understanding and was agreeable with the care plan.     MEDICATIONS GIVEN IN THE EMERGENCY:  Medications - No data to display    NEW PRESCRIPTIONS STARTED AT TODAY'S ER VISIT  New Prescriptions    No medications on file          =================================================================    HPI      Nadine Rene is a 64 year old female with PMHx of emphysema, tobacco use disorder, asthma, GERD, chronic back pain who presents to the ED today via private vehicle with sense of two panic attacks over the last week.    She is here with her father. She reports last week she had a panic attack, and had a panic attack yesterday also. No panic attack today. She denies recreational drug use, OTC medication use, supplement use. She notes she has a prescription for an anxiolytic medication but doesn't recall what it is called, has it at home, has refills at pharmacy, but doesn't use it. She has had many prior panic attacks similar to this one. No new abdominal pain or chest pain or SOB currently or fever or illness. She doesn't use it because she notes she \"[doesn't] like to take pills\" but willing to try it going forward.      REVIEW OF SYSTEMS   All other systems reviewed and are negative except as noted above in HPI.    PAST MEDICAL HISTORY:  No past medical history on file.    PAST SURGICAL HISTORY:  No past surgical history on file.    CURRENT MEDICATIONS:    albuterol (PROAIR HFA;PROVENTIL HFA;VENTOLIN HFA) 90 mcg/actuation inhaler  albuterol (PROVENTIL) 2.5 mg /3 mL (0.083 %) nebulizer solution  furosemide (LASIX) 40 MG tablet  ibuprofen (ADVIL,MOTRIN) 400 MG tablet  inhalational spacing device Spcr        ALLERGIES:  No Known Allergies    FAMILY HISTORY:  No family history on file.    SOCIAL HISTORY:   Social History     Socioeconomic History    " "Marital status:    Tobacco Use    Smoking status: Every Day    Smokeless tobacco: Never       VITALS:  Patient Vitals for the past 24 hrs:   BP Temp Temp src Pulse Resp SpO2 Height Weight   10/09/24 1041 -- -- -- -- -- 98 % -- --   10/09/24 1000 130/71 97.8  F (36.6  C) Oral 89 16 90 % 1.6 m (5' 3\") 99.8 kg (220 lb)       PHYSICAL EXAM    GENERAL: Awake, alert.  In no acute distress.   HEENT: Normocephalic, atraumatic.  Pupils equal, round and reactive.  Conjunctiva normal.  EOMI. Edentulous.  NECK: No stridor or apparent deformity.  PULMONARY: Symmetrical breath sounds without distress.  Lungs clear to auscultation bilaterally without wheezes, rhonchi or rales.  CARDIO: Regular rate and rhythm.  No significant murmur, rub or gallop.  Radial pulses strong and symmetrical.  ABDOMINAL: Abdomen soft, non-distended and non-tender to palpation.  No CVAT, no palpable hepatosplenomegaly.  EXTREMITIES: No lower extremity swelling or edema.    NEURO: Alert and oriented to person, place and time.  Cranial nerves grossly intact.  No focal motor deficit.  PSYCH: Normal mood and affect  SKIN: No rashes              EKG:    Reviewed and interpreted as: 0954 normal sinus rhythm without ST anomalies, HR 91, no prior EKG available for comparison purposes      I have independently reviewed and interpreted the EKG(s) documented above.       Keara Strickland MD  10/09/24 1042    "

## 2024-10-12 LAB
ATRIAL RATE - MUSE: 91 BPM
DIASTOLIC BLOOD PRESSURE - MUSE: 76 MMHG
INTERPRETATION ECG - MUSE: NORMAL
P AXIS - MUSE: 55 DEGREES
PR INTERVAL - MUSE: 156 MS
QRS DURATION - MUSE: 106 MS
QT - MUSE: 388 MS
QTC - MUSE: 477 MS
R AXIS - MUSE: 72 DEGREES
SYSTOLIC BLOOD PRESSURE - MUSE: 126 MMHG
T AXIS - MUSE: 50 DEGREES
VENTRICULAR RATE- MUSE: 91 BPM